# Patient Record
Sex: MALE | Employment: FULL TIME | URBAN - NONMETROPOLITAN AREA
[De-identification: names, ages, dates, MRNs, and addresses within clinical notes are randomized per-mention and may not be internally consistent; named-entity substitution may affect disease eponyms.]

---

## 2022-03-30 ENCOUNTER — OFFICE VISIT (OUTPATIENT)
Dept: MEDICAL GROUP | Facility: PHYSICIAN GROUP | Age: 37
End: 2022-03-30
Payer: COMMERCIAL

## 2022-03-30 VITALS
TEMPERATURE: 98.3 F | DIASTOLIC BLOOD PRESSURE: 70 MMHG | HEART RATE: 85 BPM | OXYGEN SATURATION: 97 % | SYSTOLIC BLOOD PRESSURE: 122 MMHG | BODY MASS INDEX: 22.68 KG/M2 | WEIGHT: 158.4 LBS | HEIGHT: 70 IN | RESPIRATION RATE: 12 BRPM

## 2022-03-30 DIAGNOSIS — R07.89 CHEST TIGHTNESS: ICD-10-CM

## 2022-03-30 DIAGNOSIS — Z87.891 HISTORY OF CIGARETTE SMOKING: ICD-10-CM

## 2022-03-30 DIAGNOSIS — Z23 NEED FOR VACCINATION: ICD-10-CM

## 2022-03-30 DIAGNOSIS — C20 RECTAL CANCER (HCC): ICD-10-CM

## 2022-03-30 DIAGNOSIS — J45.909 ASTHMA DUE TO ENVIRONMENTAL ALLERGIES: ICD-10-CM

## 2022-03-30 DIAGNOSIS — Z00.00 ANNUAL PHYSICAL EXAM: ICD-10-CM

## 2022-03-30 PROBLEM — D50.0 IRON DEFICIENCY ANEMIA DUE TO CHRONIC BLOOD LOSS: Status: ACTIVE | Noted: 2019-09-06

## 2022-03-30 PROBLEM — K62.5 RECTAL BLEEDING: Status: ACTIVE | Noted: 2019-07-30

## 2022-03-30 PROBLEM — K56.609 SMALL BOWEL OBSTRUCTION (HCC): Status: ACTIVE | Noted: 2020-04-28

## 2022-03-30 PROCEDURE — 90471 IMMUNIZATION ADMIN: CPT | Performed by: NURSE PRACTITIONER

## 2022-03-30 PROCEDURE — 90686 IIV4 VACC NO PRSV 0.5 ML IM: CPT | Performed by: NURSE PRACTITIONER

## 2022-03-30 PROCEDURE — 99204 OFFICE O/P NEW MOD 45 MIN: CPT | Mod: 25 | Performed by: NURSE PRACTITIONER

## 2022-03-30 RX ORDER — MONTELUKAST SODIUM 10 MG/1
10 TABLET ORAL DAILY
Qty: 30 TABLET | Refills: 1 | Status: SHIPPED | OUTPATIENT
Start: 2022-03-30 | End: 2022-07-26

## 2022-03-30 RX ORDER — FLUTICASONE PROPIONATE 44 MCG
AEROSOL WITH ADAPTER (GRAM) INHALATION
COMMUNITY
End: 2022-05-31

## 2022-03-30 SDOH — HEALTH STABILITY: PHYSICAL HEALTH: ON AVERAGE, HOW MANY DAYS PER WEEK DO YOU ENGAGE IN MODERATE TO STRENUOUS EXERCISE (LIKE A BRISK WALK)?: 4 DAYS

## 2022-03-30 SDOH — ECONOMIC STABILITY: FOOD INSECURITY: WITHIN THE PAST 12 MONTHS, YOU WORRIED THAT YOUR FOOD WOULD RUN OUT BEFORE YOU GOT MONEY TO BUY MORE.: NEVER TRUE

## 2022-03-30 SDOH — HEALTH STABILITY: MENTAL HEALTH
STRESS IS WHEN SOMEONE FEELS TENSE, NERVOUS, ANXIOUS, OR CAN'T SLEEP AT NIGHT BECAUSE THEIR MIND IS TROUBLED. HOW STRESSED ARE YOU?: TO SOME EXTENT

## 2022-03-30 SDOH — ECONOMIC STABILITY: HOUSING INSECURITY
IN THE LAST 12 MONTHS, WAS THERE A TIME WHEN YOU DID NOT HAVE A STEADY PLACE TO SLEEP OR SLEPT IN A SHELTER (INCLUDING NOW)?: PATIENT REFUSED

## 2022-03-30 SDOH — HEALTH STABILITY: PHYSICAL HEALTH: ON AVERAGE, HOW MANY MINUTES DO YOU ENGAGE IN EXERCISE AT THIS LEVEL?: 20 MIN

## 2022-03-30 SDOH — ECONOMIC STABILITY: FOOD INSECURITY: WITHIN THE PAST 12 MONTHS, THE FOOD YOU BOUGHT JUST DIDN'T LAST AND YOU DIDN'T HAVE MONEY TO GET MORE.: NEVER TRUE

## 2022-03-30 SDOH — ECONOMIC STABILITY: TRANSPORTATION INSECURITY
IN THE PAST 12 MONTHS, HAS LACK OF RELIABLE TRANSPORTATION KEPT YOU FROM MEDICAL APPOINTMENTS, MEETINGS, WORK OR FROM GETTING THINGS NEEDED FOR DAILY LIVING?: NO

## 2022-03-30 SDOH — ECONOMIC STABILITY: TRANSPORTATION INSECURITY
IN THE PAST 12 MONTHS, HAS THE LACK OF TRANSPORTATION KEPT YOU FROM MEDICAL APPOINTMENTS OR FROM GETTING MEDICATIONS?: NO

## 2022-03-30 SDOH — ECONOMIC STABILITY: INCOME INSECURITY: HOW HARD IS IT FOR YOU TO PAY FOR THE VERY BASICS LIKE FOOD, HOUSING, MEDICAL CARE, AND HEATING?: SOMEWHAT HARD

## 2022-03-30 SDOH — ECONOMIC STABILITY: TRANSPORTATION INSECURITY
IN THE PAST 12 MONTHS, HAS LACK OF TRANSPORTATION KEPT YOU FROM MEETINGS, WORK, OR FROM GETTING THINGS NEEDED FOR DAILY LIVING?: NO

## 2022-03-30 SDOH — ECONOMIC STABILITY: INCOME INSECURITY: IN THE LAST 12 MONTHS, WAS THERE A TIME WHEN YOU WERE NOT ABLE TO PAY THE MORTGAGE OR RENT ON TIME?: PATIENT REFUSED

## 2022-03-30 SDOH — ECONOMIC STABILITY: HOUSING INSECURITY

## 2022-03-30 ASSESSMENT — SOCIAL DETERMINANTS OF HEALTH (SDOH)
WITHIN THE PAST 12 MONTHS, YOU WORRIED THAT YOUR FOOD WOULD RUN OUT BEFORE YOU GOT THE MONEY TO BUY MORE: NEVER TRUE
HOW MANY DRINKS CONTAINING ALCOHOL DO YOU HAVE ON A TYPICAL DAY WHEN YOU ARE DRINKING: 1 OR 2
ARE YOU MARRIED, WIDOWED, DIVORCED, SEPARATED, NEVER MARRIED, OR LIVING WITH A PARTNER?: NEVER MARRIED
HOW OFTEN DO YOU ATTEND CHURCH OR RELIGIOUS SERVICES?: MORE THAN 4 TIMES PER YEAR
IN A TYPICAL WEEK, HOW MANY TIMES DO YOU TALK ON THE PHONE WITH FAMILY, FRIENDS, OR NEIGHBORS?: MORE THAN THREE TIMES A WEEK
HOW OFTEN DO YOU GET TOGETHER WITH FRIENDS OR RELATIVES?: ONCE A WEEK
HOW OFTEN DO YOU ATTENT MEETINGS OF THE CLUB OR ORGANIZATION YOU BELONG TO?: NEVER
ARE YOU MARRIED, WIDOWED, DIVORCED, SEPARATED, NEVER MARRIED, OR LIVING WITH A PARTNER?: NEVER MARRIED
DO YOU BELONG TO ANY CLUBS OR ORGANIZATIONS SUCH AS CHURCH GROUPS UNIONS, FRATERNAL OR ATHLETIC GROUPS, OR SCHOOL GROUPS?: NO
DO YOU BELONG TO ANY CLUBS OR ORGANIZATIONS SUCH AS CHURCH GROUPS UNIONS, FRATERNAL OR ATHLETIC GROUPS, OR SCHOOL GROUPS?: NO
HOW OFTEN DO YOU HAVE SIX OR MORE DRINKS ON ONE OCCASION: MONTHLY
HOW OFTEN DO YOU ATTENT MEETINGS OF THE CLUB OR ORGANIZATION YOU BELONG TO?: NEVER
HOW OFTEN DO YOU HAVE A DRINK CONTAINING ALCOHOL: 2-3 TIMES A WEEK
IN A TYPICAL WEEK, HOW MANY TIMES DO YOU TALK ON THE PHONE WITH FAMILY, FRIENDS, OR NEIGHBORS?: MORE THAN THREE TIMES A WEEK
HOW HARD IS IT FOR YOU TO PAY FOR THE VERY BASICS LIKE FOOD, HOUSING, MEDICAL CARE, AND HEATING?: SOMEWHAT HARD
HOW OFTEN DO YOU GET TOGETHER WITH FRIENDS OR RELATIVES?: ONCE A WEEK
HOW OFTEN DO YOU ATTEND CHURCH OR RELIGIOUS SERVICES?: MORE THAN 4 TIMES PER YEAR

## 2022-03-30 ASSESSMENT — LIFESTYLE VARIABLES
HOW MANY STANDARD DRINKS CONTAINING ALCOHOL DO YOU HAVE ON A TYPICAL DAY: 1 OR 2
HOW OFTEN DO YOU HAVE A DRINK CONTAINING ALCOHOL: 2-3 TIMES A WEEK
HOW OFTEN DO YOU HAVE SIX OR MORE DRINKS ON ONE OCCASION: MONTHLY

## 2022-03-30 ASSESSMENT — PATIENT HEALTH QUESTIONNAIRE - PHQ9: CLINICAL INTERPRETATION OF PHQ2 SCORE: 0

## 2022-03-30 NOTE — ASSESSMENT & PLAN NOTE
New to me.  Diagnosis of rectal cancer in 2019.  Status post chemo and radiation.  April 2020 at rectum removed and part of the colon.  Reversal surgery was 2 months later.  He is doing well now.  Needing a referral to oncology for monitoring.  Last oncology back in [place] was in September, patient is in a routine 6 months monitoring.  Patient moved from [place] to [facility], has insurance now and needing to establish with oncology.  Patient brought last colonoscopy and CT results with him today.  Full scanned into the system.  Denies fevers, no unplanned weight loss, no rectal bleeding.

## 2022-03-30 NOTE — PROGRESS NOTES
Chief Complaint   Patient presents with   • Establish Care   • Referral Needed       HISTORY OF PRESENT ILLNESS: Patient is a 37 y.o. male, established patient who presents today to discuss medical problems as listed below:    Health Maintenance:  COMPLETED     Rectal cancer (HCC)  New to me.  Diagnosis of rectal cancer in 2019.  Status post chemo and radiation.  April 2020 at rectum removed and part of the colon.  Reversal surgery was 2 months later.  He is doing well now.  Needing a referral to oncology for monitoring.  Last oncology back in [place] was in September, patient is in a routine 6 months monitoring.  Patient moved from [place] to [facility], has insurance now and needing to establish with oncology.  Patient brought last colonoscopy and CT results with him today.  Full scanned into the system.  Denies fevers, no unplanned weight loss, no rectal bleeding.     Chest tightness  New to me.  Symptoms include chest tightness and occasional cough more so in the mornings or evenings, as well as some shortness of breath. Denies wheezing, CP except for feeling some soreness in the right side of the chest.  Admits to rhinitis, phlegmy cough and PND.  No diagnosed asthma or allergies.  Vitals within normal limits today.  Patient reports following up with pulmonology however no conclusive results per patient.  On the inhalers, symptoms are getting worse, no recent cancer is helping.      Patient Active Problem List    Diagnosis Date Noted   • Chest tightness 03/30/2022   • Small bowel obstruction (HCC) 04/28/2020   • Rectal cancer (HCC) 04/20/2020   • Iron deficiency anemia due to chronic blood loss 09/06/2019   • Adenocarcinoma of rectum, stage 3 (HCC) 08/28/2019   • Rectal bleeding 07/30/2019        Allergies: Patient has no known allergies.    Current Outpatient Medications   Medication Sig Dispense Refill   • fluticasone (FLOVENT HFA) 44 MCG/ACT Aerosol      • ipratropium-albuterol (COMBIVENT RESPIMAT)   MCG/ACT Aero Soln Inhale 1 Puff.     • Multiple Vitamin (MULTI-VITAMIN) Tab Take 1 Tablet by mouth every day.     • montelukast (SINGULAIR) 10 MG Tab Take 1 Tablet by mouth every day. 30 Tablet 1     No current facility-administered medications for this visit.       Social History     Tobacco Use   • Smoking status: Former Smoker     Packs/day: 1.00     Years: 25.00     Pack years: 25.00     Types: Cigarettes, Electronic Cigarettes     Quit date:      Years since quittin.2   • Smokeless tobacco: Never Used   Vaping Use   • Vaping Use: Never used   Substance Use Topics   • Alcohol use: Yes     Alcohol/week: 1.8 - 3.0 oz     Types: 3 - 5 Glasses of wine per week   • Drug use: Not Currently     Social History     Social History Narrative   • Not on file       History reviewed. No pertinent family history.    Allergies, past medical history, past surgical history, family history, social history reviewed and updated.    Review of Systems:     - Constitutional: Negative for fever, chills, unexpected weight change, and fatigue/generalized weakness.     - HEENT: rhinorrhea. Negative for headaches, vision changes, hearing changes, ear pain, ear discharge, sinus congestion, sore throat, and neck pain.      - Respiratory: cough,chest congestion, dyspnea . Negative for sputum production,  wheezing, and crackles.      - Cardiovascular: Negative for chest pain, palpitations, orthopnea, and bilateral lower extremity edema.     - Gastrointestinal: Negative for heartburn, nausea, vomiting, abdominal pain, hematochezia, melena, diarrhea, constipation, and greasy/foul-smelling stools.     - Genitourinary: Negative for dysuria, polyuria, hematuria, pyuria, urinary urgency, and urinary incontinence.    - Musculoskeletal: Negative for myalgias, back pain, and joint pain.     - Skin: Negative for rash, itching, cyanotic skin color change.     - Neurological: Negative for dizziness, tingling, tremors, focal sensory deficit, focal  "weakness and headaches.     - Endo/Heme/Allergies: Does not bruise/bleed easily.     - Psychiatric/Behavioral: Negative for depression, suicidal/homicidal ideation and memory loss.      All other systems reviewed and are negative    Exam:    /70   Pulse 85   Temp 36.8 °C (98.3 °F) (Temporal)   Resp 12   Ht 1.778 m (5' 10\")   Wt 71.8 kg (158 lb 6.4 oz)   SpO2 97%   BMI 22.73 kg/m²  Body mass index is 22.73 kg/m².    Physical Exam:  Constitutional: Well-developed and well-nourished. Not diaphoretic. No distress.   Skin: Skin is warm and dry. No rash noted.  Head: Atraumatic without lesions.  Eyes: Conjunctivae and extraocular motions are normal. Pupils are equal, round, and reactive to light. No scleral icterus.   Ears:  External ears unremarkable. Tympanic membranes clear and intact.  Nose: Nares patent. Septum midline. Turbinates without erythema nor edema. No discharge.   Mouth/Throat: Dentition is normal. Tongue normal. Oropharynx is clear and moist. Posterior pharynx without erythema or exudates.  Neck: Supple, trachea midline. Normal range of motion. No thyromegaly present. No lymphadenopathy--cervical or supraclavicular.  Cardiovascular: Regular rate and rhythm, S1 and S2 without murmur, rubs, or gallops.    Chest: Effort normal. Clear to auscultation throughout. No adventitious sounds. No CVA tenderness.  Abdomen: Soft, non tender, and without distention. Active bowel sounds in all four quadrants. No rebound, guarding, masses or HSM.  : Negative for dysuria, polyuria, hematuria, pyuria, urinary urgency, and urinary incontinence.  Extremities: No cyanosis, clubbing, erythema, nor edema. Distal pulses intact and symmetric.   Musculoskeletal: All major joints AROM full in all directions without pain.  Neurological: Alert and oriented x 3. DTRs 2+/3 and symmetric. No cranial nerve deficit. 5/5 myotomes. Sensation intact. Negative Rhomberg.  Psychiatric:  Behavior, mood, and affect are " appropriate.  MA/nursing note and vitals reviewed.    Assessment/Plan:  1. Need for vaccinatio  - INFLUENZA VACCINE QUAD INJ (PF)    2. Rectal cancer (HCC)  - Referral to Hematology Oncology    3. Chest tightness  - PULMONARY FUNCTION TESTS -Test requested: Complete Pulmonary Function Test; Future  - DX-CHEST-2 VIEWS; Future  - montelukast (SINGULAIR) 10 MG Tab; Take 1 Tablet by mouth every day.  Dispense: 30 Tablet; Refill: 1    4. Asthma due to environmental allergies  Uncontrolled, stable. Trial of Montelukast. Will evaluate for asthma vs COPD (hx of smoking 25 yrs)  - montelukast (SINGULAIR) 10 MG Tab; Take 1 Tablet by mouth every day.  Dispense: 30 Tablet; Refill: 1    5. Annual physical exam  - CBC WITHOUT DIFFERENTIAL; Future  - Comp Metabolic Panel; Future  - TSH; Future  - VITAMIN D,25 HYDROXY; Future  - Lipid Profile; Future  - VITAMIN B12; Future  - IRON/TOTAL IRON BIND; Future  - FOLATE; Future  - FERRITIN; Future       Discussed with patient possible alternative diagnoses, pt is to take all medications as prescribed. If symptoms persist FU w/PCP, if symptoms worsen go to emergency room. If experiencing any side effects from prescribed medications reports to the office immediately or go to emergency room.  Reviewed indication, dosage, usage and potential adverse effects of prescribed medications. Reviewed risks and benefits of treatment plan. Patient verbalizes understanding of all instruction and verbally agrees to plan.    No follow-ups on file. 1-2 mos

## 2022-03-30 NOTE — ASSESSMENT & PLAN NOTE
New to me.  Symptoms include chest tightness and occasional cough more so in the mornings or evenings, as well as some shortness of breath. Denies wheezing, CP except for feeling some soreness in the right side of the chest.  Admits to rhinitis, phlegmy cough and PND.  No diagnosed asthma or allergies.  Vitals within normal limits today.  Patient reports following up with pulmonology however no conclusive results per patient.  On the inhalers, symptoms are getting worse, no recent cancer is helping.

## 2022-05-31 ENCOUNTER — NON-PROVIDER VISIT (OUTPATIENT)
Dept: URGENT CARE | Facility: CLINIC | Age: 37
End: 2022-05-31
Payer: COMMERCIAL

## 2022-05-31 ENCOUNTER — APPOINTMENT (OUTPATIENT)
Dept: RADIOLOGY | Facility: IMAGING CENTER | Age: 37
End: 2022-05-31
Attending: NURSE PRACTITIONER
Payer: COMMERCIAL

## 2022-05-31 ENCOUNTER — OFFICE VISIT (OUTPATIENT)
Dept: MEDICAL GROUP | Facility: PHYSICIAN GROUP | Age: 37
End: 2022-05-31
Payer: COMMERCIAL

## 2022-05-31 VITALS
TEMPERATURE: 97.8 F | OXYGEN SATURATION: 98 % | BODY MASS INDEX: 22.88 KG/M2 | RESPIRATION RATE: 12 BRPM | WEIGHT: 159.8 LBS | HEIGHT: 70 IN | HEART RATE: 83 BPM | SYSTOLIC BLOOD PRESSURE: 110 MMHG | DIASTOLIC BLOOD PRESSURE: 68 MMHG

## 2022-05-31 DIAGNOSIS — R07.89 CHEST TIGHTNESS: ICD-10-CM

## 2022-05-31 DIAGNOSIS — C20 ADENOCARCINOMA OF RECTUM, STAGE 3 (HCC): ICD-10-CM

## 2022-05-31 DIAGNOSIS — J45.30 MILD PERSISTENT ASTHMA WITHOUT COMPLICATION: ICD-10-CM

## 2022-05-31 PROCEDURE — 99214 OFFICE O/P EST MOD 30 MIN: CPT | Performed by: NURSE PRACTITIONER

## 2022-05-31 PROCEDURE — 71046 X-RAY EXAM CHEST 2 VIEWS: CPT | Mod: TC | Performed by: PHYSICIAN ASSISTANT

## 2022-05-31 RX ORDER — FLUTICASONE PROPIONATE 110 UG/1
AEROSOL, METERED RESPIRATORY (INHALATION)
COMMUNITY
End: 2022-05-31

## 2022-05-31 RX ORDER — ALBUTEROL SULFATE 90 UG/1
2 AEROSOL, METERED RESPIRATORY (INHALATION) EVERY 6 HOURS PRN
Qty: 8.5 G | Refills: 2 | Status: SHIPPED | OUTPATIENT
Start: 2022-05-31 | End: 2022-07-21 | Stop reason: SDUPTHER

## 2022-05-31 RX ORDER — FLUTICASONE PROPIONATE AND SALMETEROL 100; 50 UG/1; UG/1
1 POWDER RESPIRATORY (INHALATION) EVERY 12 HOURS
Qty: 1 EACH | Refills: 1 | Status: SHIPPED | OUTPATIENT
Start: 2022-05-31 | End: 2022-07-21 | Stop reason: SDUPTHER

## 2022-05-31 NOTE — PROGRESS NOTES
Chief Complaint   Patient presents with   • Follow-Up       HISTORY OF PRESENT ILLNESS: Patient is a 37 y.o. male, established patient who presents today to discuss medical problems as listed below:    Mild persistent asthma without complication  Chronic intermittent.  Patient reports chest tightness and wheezing.  Exposed to friend who was diagnosed with COVID at the beginning of April.  Denies fevers, body aches.  Is scheduled to obtain PFTs on [facility] 15, not had a chance to complete CXR.  For allergies he is taking montelukast, took for a month, has not noticed any changes.    Adenocarcinoma of rectum, stage 3 (HCC)  Chronic problem.  Diagnosed in August 2019, status post  LOW ANTERIOR BOWEL RESECTION 04/20/2020    REVISION / TAKEDOWN COLOSTOMY 06/2020     Moved from [place], reestablished with oncologist in [place], Dr. Tyson          Patient Active Problem List    Diagnosis Date Noted   • Mild persistent asthma without complication 05/02/2022   • Chest tightness 03/30/2022   • History of cigarette smoking 03/30/2022   • Small bowel obstruction (HCC) 04/28/2020   • Rectal cancer ([facility]) 04/20/2020   • Iron deficiency anemia due to chronic blood loss 09/06/2019   • Adenocarcinoma of rectum, stage 3 (HCC) 08/28/2019   • Rectal bleeding 07/30/2019        Allergies: Patient has no known allergies.    Current Outpatient Medications   Medication Sig Dispense Refill   • albuterol 108 (90 Base) MCG/ACT Aero Soln inhalation aerosol Inhale 2 Puffs every 6 hours as needed for Shortness of Breath. 8.5 g 2   • fluticasone-salmeterol (ADVAIR) 100-50 MCG/ACT AEROSOL POWDER, BREATH ACTIVATED Inhale 1 Puff every 12 hours. 1 Each 1   • Multiple Vitamin (MULTI-VITAMIN) Tab Take 1 Tablet by mouth every day.     • montelukast (SINGULAIR) 10 MG Tab Take 1 Tablet by mouth every day. 30 Tablet 1     No current facility-administered medications for this visit.       Social History     Tobacco Use   • Smoking status: Former Smoker      "Packs/day: 1.00     Years: 25.00     Pack years: 25.00     Types: Cigarettes, Electronic Cigarettes     Quit date:      Years since quittin.4   • Smokeless tobacco: Never Used   Vaping Use   • Vaping Use: Never used   Substance Use Topics   • Alcohol use: Yes     Alcohol/week: 1.8 - 3.0 oz     Types: 3 - 5 Glasses of wine per week   • Drug use: Not Currently     Social History     Social History Narrative   • Not on file       History reviewed. No pertinent family history.    Allergies, past medical history, past surgical history, family history, social history reviewed and updated.    Review of Systems:     - Constitutional: Negative for fever, chills, unexpected weight change, and fatigue/generalized weakness.     - Respiratory: Intermittent chest tightness and wheezing.  Negative for cough, sputum production, chest congestion, dyspnea, and crackles.      - Cardiovascular: Negative for chest pain, palpitations, orthopnea, and bilateral lower extremity edema.     - Psychiatric/Behavioral: Negative for depression, suicidal/homicidal ideation and memory loss.      All other systems reviewed and are negative    Exam:    /68   Pulse 83   Temp 36.6 °C (97.8 °F) (Temporal)   Resp 12   Ht 1.778 m (5' 10\")   Wt 72.5 kg (159 lb 12.8 oz)   SpO2 98%   BMI 22.93 kg/m²  Body mass index is 22.93 kg/m².    Physical Exam:  Constitutional: Well-developed and well-nourished. Not diaphoretic. No distress.   Cardiovascular: Regular rate and rhythm, S1 and S2 without murmur, rubs, or gallops.    Chest: Effort normal. Clear to auscultation throughout. No adventitious sounds.   Neurological: Alert and oriented x 3.   Psychiatric:  Behavior, mood, and affect are appropriate.  MA/nursing note and vitals reviewed.    Assessment/Plan:  1. Mild persistent asthma without complication  Educated on asthma etiology.  Albuterol use has emergency with patient at all times.  Trial of Advair twice daily prior to brushing teeth.  " PFTs scheduled for [facility].  Patient to complete CXR.  We will follow-up in 3 to 4 weeks.  - albuterol 108 (90 Base) MCG/ACT Aero Soln inhalation aerosol; Inhale 2 Puffs every 6 hours as needed for Shortness of Breath.  Dispense: 8.5 g; Refill: 2  - fluticasone-salmeterol (ADVAIR) 100-50 MCG/ACT AEROSOL POWDER, BREATH ACTIVATED; Inhale 1 Puff every 12 hours.  Dispense: 1 Each; Refill: 1    2. Adenocarcinoma of rectum, stage 3 (HCC)  Stable.  Following with oncologist, Dr. Tyson       Discussed with patient possible alternative diagnoses, pt is to take all medications as prescribed. If symptoms persist FU w/PCP, if symptoms worsen go to emergency room. If experiencing any side effects from prescribed medications reports to the office immediately or go to emergency room.  Reviewed indication, dosage, usage and potential adverse effects of prescribed medications. Reviewed risks and benefits of treatment plan. Patient verbalizes understanding of all instruction and verbally agrees to plan.    No follow-ups on file. 1 mo

## 2022-05-31 NOTE — ASSESSMENT & PLAN NOTE
Chronic problem.  Diagnosed in August 2019, status post  LOW ANTERIOR BOWEL RESECTION 04/20/2020    REVISION / TAKEDOWN COLOSTOMY 06/2020     Moved from [place], reestablished with oncologist in [place], Dr. Tyson

## 2022-05-31 NOTE — ASSESSMENT & PLAN NOTE
Chronic intermittent.  Patient reports chest tightness and wheezing.  Exposed to friend who was diagnosed with COVID at the beginning of April.  Denies fevers, body aches.  Is scheduled to obtain PFTs on [facility] 15, not had a chance to complete CXR.  For allergies he is taking montelukast, took for a month, has not noticed any changes.

## 2022-06-15 ENCOUNTER — HOSPITAL ENCOUNTER (OUTPATIENT)
Dept: PULMONOLOGY | Facility: MEDICAL CENTER | Age: 37
End: 2022-06-15
Attending: NURSE PRACTITIONER
Payer: COMMERCIAL

## 2022-06-15 DIAGNOSIS — R07.89 CHEST TIGHTNESS: ICD-10-CM

## 2022-06-15 PROCEDURE — 94729 DIFFUSING CAPACITY: CPT

## 2022-06-15 PROCEDURE — 94060 EVALUATION OF WHEEZING: CPT

## 2022-06-15 PROCEDURE — 94729 DIFFUSING CAPACITY: CPT | Mod: 26,GZ | Performed by: INTERNAL MEDICINE

## 2022-06-15 PROCEDURE — 94726 PLETHYSMOGRAPHY LUNG VOLUMES: CPT

## 2022-06-15 PROCEDURE — 94726 PLETHYSMOGRAPHY LUNG VOLUMES: CPT | Mod: 26,GZ | Performed by: INTERNAL MEDICINE

## 2022-06-15 PROCEDURE — 94060 EVALUATION OF WHEEZING: CPT | Mod: 26,GZ | Performed by: INTERNAL MEDICINE

## 2022-06-15 RX ORDER — ALBUTEROL SULFATE 2.5 MG/3ML
2.5 SOLUTION RESPIRATORY (INHALATION)
Status: DISCONTINUED | OUTPATIENT
Start: 2022-06-15 | End: 2022-06-16 | Stop reason: HOSPADM

## 2022-06-15 RX ADMIN — ALBUTEROL SULFATE 2.5 MG: 2.5 SOLUTION RESPIRATORY (INHALATION) at 09:04

## 2022-06-15 ASSESSMENT — PULMONARY FUNCTION TESTS
FEV1/FVC: 79
FEV1/FVC: 79.02
FEV1/FVC_PERCENT_PREDICTED: 97
FEV1_PERCENT_CHANGE: -1
FEV1_PERCENT_PREDICTED: 94
FEV1/FVC: 81
FVC: 5.09
FEV1/FVC_PERCENT_LLN: 68
FEV1: 4.11
FEV1/FVC_PREDICTED: 81
FEV1_PERCENT_PREDICTED: 96
FVC_LLN: 4.40
FEV1/FVC_PERCENT_PREDICTED: 81
FVC_PERCENT_PREDICTED: 96
FEV1_PERCENT_CHANGE: 0
FVC: 5.1
FEV1_PREDICTED: 4.25
FEV1/FVC_PERCENT_PREDICTED: 100
FVC_LLN: 4.40
FEV1: 4.03
FEV1/FVC_PERCENT_PREDICTED: 99
FVC_PERCENT_PREDICTED: 96
FVC_PREDICTED: 5.27
FEV1/FVC_PERCENT_LLN: 68
FEV1/FVC_PERCENT_PREDICTED: 98
FEV1/FVC_PERCENT_CHANGE: -2
FEV1/FVC: 81
FEV1_LLN: 3.55
FEV1_LLN: 3.55

## 2022-06-18 NOTE — PROCEDURES
DATE OF SERVICE:  06/15/2022     PULMONARY FUNCTION TEST INTERPRETATION     REFERRING PHYSICIAN:  YONATAN Fermin     INTERPRETING PHYSICIAN:  Urszula Brownlee MD     REASON FOR STUDY:  Chest tightness.     STUDY ADEQUACY:  Good efforts, patient met ATS standards by flow volume loop   and expiratory time.     RESULTS:  SPIROMETRY:  FVC is 5.09, which is 96% of predicted without a significant change   postbronchodilator.  FEV1 is 4.11, which is 96% of predicted without a significant change   postbronchodilator.  FEV1/FVC is 81.     LUNG VOLUMES:  TLC is 7.24, which is 104% of predicted.  RV is 2.28, which is 126% of predicted.     DIFFUSION CAPACITY:  DLCO is 34.34, which is 109% of predicted.  DL/VA is 5.07, which is 112% of predicted.     INTERPRETATION:  1.  Spirometry is normal.  2.  There is no significant change postbronchodilator.  3.  The flow volume loop is significant for a slightly flattened inspiratory   loop which may indicate vocal cord dysfunction.  4.  Lung volumes are significant for mild air trapping without hyperinflation.  5.  The diffusion capacity is normal.  6.  There are no prior studies for comparison.        ______________________________  Urszula Brownlee MD    ARTURO/JERRY    DD:  06/18/2022 15:02  DT:  06/18/2022 16:30    Job#:  [id number]

## 2022-07-21 DIAGNOSIS — J45.30 MILD PERSISTENT ASTHMA WITHOUT COMPLICATION: ICD-10-CM

## 2022-07-21 RX ORDER — FLUTICASONE PROPIONATE AND SALMETEROL 100; 50 UG/1; UG/1
1 POWDER RESPIRATORY (INHALATION) EVERY 12 HOURS
Qty: 1 EACH | Refills: 1 | Status: SHIPPED | OUTPATIENT
Start: 2022-07-21 | End: 2022-07-26 | Stop reason: SDUPTHER

## 2022-07-21 RX ORDER — ALBUTEROL SULFATE 90 UG/1
2 AEROSOL, METERED RESPIRATORY (INHALATION) EVERY 6 HOURS PRN
Qty: 8.5 G | Refills: 2 | Status: SHIPPED | OUTPATIENT
Start: 2022-07-21 | End: 2022-07-26 | Stop reason: SDUPTHER

## 2022-07-26 ENCOUNTER — OFFICE VISIT (OUTPATIENT)
Dept: MEDICAL GROUP | Facility: PHYSICIAN GROUP | Age: 37
End: 2022-07-26
Payer: COMMERCIAL

## 2022-07-26 VITALS
WEIGHT: 160.6 LBS | HEART RATE: 79 BPM | RESPIRATION RATE: 12 BRPM | DIASTOLIC BLOOD PRESSURE: 68 MMHG | OXYGEN SATURATION: 98 % | TEMPERATURE: 98.1 F | BODY MASS INDEX: 22.99 KG/M2 | HEIGHT: 70 IN | SYSTOLIC BLOOD PRESSURE: 114 MMHG

## 2022-07-26 DIAGNOSIS — R20.8 LOCALIZED SKIN TENDERNESS: ICD-10-CM

## 2022-07-26 DIAGNOSIS — J45.30 MILD PERSISTENT ASTHMA WITHOUT COMPLICATION: ICD-10-CM

## 2022-07-26 DIAGNOSIS — E55.9 VITAMIN D DEFICIENCY: ICD-10-CM

## 2022-07-26 DIAGNOSIS — J38.3 VOCAL CORD DYSFUNCTION: ICD-10-CM

## 2022-07-26 PROBLEM — C20 RECTAL CANCER (HCC): Status: ACTIVE | Noted: 2019-08-22

## 2022-07-26 PROCEDURE — 99214 OFFICE O/P EST MOD 30 MIN: CPT | Performed by: NURSE PRACTITIONER

## 2022-07-26 RX ORDER — FLUTICASONE PROPIONATE AND SALMETEROL 100; 50 UG/1; UG/1
1 POWDER RESPIRATORY (INHALATION) EVERY 12 HOURS
Qty: 1 EACH | Refills: 3 | Status: SHIPPED | OUTPATIENT
Start: 2022-07-26 | End: 2023-01-03

## 2022-07-26 RX ORDER — ALBUTEROL SULFATE 90 UG/1
2 AEROSOL, METERED RESPIRATORY (INHALATION) EVERY 6 HOURS PRN
Qty: 8.5 G | Refills: 2 | Status: SHIPPED | OUTPATIENT
Start: 2022-07-26 | End: 2023-04-25

## 2022-07-26 RX ORDER — ERGOCALCIFEROL 1.25 MG/1
50000 CAPSULE ORAL
Qty: 12 CAPSULE | Refills: 3 | Status: SHIPPED | OUTPATIENT
Start: 2022-07-26

## 2022-07-26 RX ORDER — ERGOCALCIFEROL 1.25 MG/1
50000 CAPSULE ORAL
Qty: 12 CAPSULE | Refills: 3 | Status: SHIPPED | OUTPATIENT
Start: 2022-07-26 | End: 2022-07-26 | Stop reason: SDUPTHER

## 2022-07-26 ASSESSMENT — FIBROSIS 4 INDEX: FIB4 SCORE: 1

## 2022-07-26 NOTE — ASSESSMENT & PLAN NOTE
Chronic problem, localized skin tenderness at the right upper chest since 2015, only noted with taking a deep breath.  This happens daily.  There is no associated erythema or edema, no tenderness on palpation.  Patient reports this became slightly worse after diagnosis of COVID x2, the most recent diagnosis in April 2022.   Was previously evaluated by his former PCP and pulmonologist, suspected MS etiology.  Scar noted above the affected tender spot from chemo pump (history of stage III colon cancer, treated, in remission)    CXR from May 2022 benign.    Recent PFT benign except for suspicion of vocal cord dysfunction for which patient is going to follow-up with ENT.    The most recent CT of the chest and abdomen from September 2021 also benign, see results below.  FINDINGS:   Lower neck: Soft tissue lower neck are unremarkable. No supraclavicular lymphadenopathy.     Chest: Interval removal of right internal jugular power port.     Heart is normal in size with no evidence of pericardial effusion or thickening. No coronary artery calcification. Pulmonary arteries normal in caliber. Thoracic aorta is normal in caliber with no evidence of atherosclerotic calcification. Supraaortic vasculature is unremarkable.     Tracheobronchial tree is unremarkable.     No hilar, mediastinal, or axillary lymphadenopathy.     No pulmonary parenchymal consolidation. No atelectasis or scar. No pulmonary nodule or mass. No effusion. No pneumothorax.     No acute or aggressive osseous lesions involving the thoracic spine or cage

## 2022-07-26 NOTE — PROGRESS NOTES
Chief Complaint   Patient presents with   • Results   • Lab Results       HISTORY OF PRESENT ILLNESS: Patient is a 37 y.o. male, established patient who presents today to discuss medical problems as listed below:    Health Maintenance:  COMPLETED     Vitamin D deficiency  Labs from 7/2022 low vit D at 22. On Rx, compliant with regimen    Vocal cord dysfunction  Vocal cord dysfunction suspected on  PFT evaluation from 7/2022    INTERPRETATION:  1.  Spirometry is normal.  2.  There is no significant change postbronchodilator.  3.  The flow volume loop is significant for a slightly flattened inspiratory   loop which may indicate vocal cord dysfunction.  4.  Lung volumes are significant for mild air trapping without hyperinflation.  5.  The diffusion capacity is normal.  6.  There are no prior studies for comparison.    Sleep apnea test 2 to 3 years ago and benign, per patient.  This was done in [place].    Patient continues to experience occasional short of breath after having to talk during meetings at work. No CP.  When experiencing labored breathing, albuterol and Advair inhalers help.    Not new to [facility], he lives in [facility], previously in [place] where the elevation was over 5000 feet.  Experienced the same symptoms in [place].    CXR and CT chest benign    Localized skin tenderness  Chronic problem, localized skin tenderness at the right upper chest since 2015, only noted with taking a deep breath.  This happens daily.  There is no associated erythema or edema, no tenderness on palpation.  Patient reports this became slightly worse after diagnosis of COVID x2, the most recent diagnosis in April 2022.   Was previously evaluated by his former PCP and pulmonologist, suspected MS etiology.    CXR from May 2022 benign.    Recent PFT benign except for suspicion of vocal cord dysfunction for which patient is going to follow-up with ENT.    The most recent CT of the chest and abdomen from September 2021 also  benign, see results below.  FINDINGS:   Lower neck: Soft tissue lower neck are unremarkable. No supraclavicular lymphadenopathy.     Chest: Interval removal of right internal jugular power port.     Heart is normal in size with no evidence of pericardial effusion or thickening. No coronary artery calcification. Pulmonary arteries normal in caliber. Thoracic aorta is normal in caliber with no evidence of atherosclerotic calcification. Supraaortic vasculature is unremarkable.     Tracheobronchial tree is unremarkable.     No hilar, mediastinal, or axillary lymphadenopathy.     No pulmonary parenchymal consolidation. No atelectasis or scar. No pulmonary nodule or mass. No effusion. No pneumothorax.     No acute or aggressive osseous lesions involving the thoracic spine or cage        Patient Active Problem List    Diagnosis Date Noted   • Vitamin D deficiency 07/26/2022   • Vocal cord dysfunction 07/26/2022   • Localized skin tenderness 07/26/2022   • Mild persistent asthma without complication 05/02/2022   • Chest tightness 03/30/2022   • History of cigarette smoking 03/30/2022   • Small bowel obstruction (HCC) 04/28/2020   • Iron deficiency anemia due to chronic blood loss 09/06/2019   • Adenocarcinoma of rectum, stage 3 (HCC) 08/28/2019   • Rectal cancer (HCC) 08/22/2019   • Rectal bleeding 07/30/2019        Allergies: Patient has no known allergies.    Current Outpatient Medications   Medication Sig Dispense Refill   • vitamin D2, Ergocalciferol, (DRISDOL) 1.25 MG (12843 UT) Cap capsule Take 1 Capsule by mouth every 7 days. 12 Capsule 3   • albuterol 108 (90 Base) MCG/ACT Aero Soln inhalation aerosol Inhale 2 Puffs every 6 hours as needed for Shortness of Breath. 8.5 g 2   • fluticasone-salmeterol (ADVAIR) 100-50 MCG/ACT AEROSOL POWDER, BREATH ACTIVATED Inhale 1 Puff every 12 hours. 1 Each 3   • Multiple Vitamin (MULTI-VITAMIN) Tab Take 1 Tablet by mouth every day.       No current facility-administered medications  "for this visit.       Social History     Tobacco Use   • Smoking status: Former Smoker     Packs/day: 1.00     Years: 25.00     Pack years: 25.00     Types: Cigarettes, Electronic Cigarettes     Quit date:      Years since quittin.5   • Smokeless tobacco: Never Used   Vaping Use   • Vaping Use: Never used   Substance Use Topics   • Alcohol use: Yes     Alcohol/week: 1.8 - 3.0 oz     Types: 3 - 5 Glasses of wine per week   • Drug use: Not Currently     Social History     Social History Narrative   • Not on file       History reviewed. No pertinent family history.    Allergies, past medical history, past surgical history, family history, social history reviewed and updated.    Review of Systems:     - Constitutional: Negative for fever, chills, unexpected weight change, and fatigue/generalized weakness.     - HEENT: Negative for headaches, vision changes, hearing changes, ear pain, ear discharge, rhinorrhea, sinus congestion, sore throat, and neck pain.      - Respiratory: Negative for cough, sputum production, chest congestion, dyspnea, wheezing, and crackles.      - Cardiovascular: Negative for chest pain, palpitations, orthopnea, and bilateral lower extremity edema.    - Skin: Tenderness but on right upper chest only with chest expansion.    - Psychiatric/Behavioral: Negative for depression, suicidal/homicidal ideation and memory loss.      All other systems reviewed and are negative    Exam:    /68 (BP Location: Left arm, Patient Position: Sitting, BP Cuff Size: Adult)   Pulse 79   Temp 36.7 °C (98.1 °F) (Temporal)   Resp 12   Ht 1.778 m (5' 10\")   Wt 72.8 kg (160 lb 9.6 oz)   SpO2 98%   BMI 23.04 kg/m²  Body mass index is 23.04 kg/m².    Physical Exam:  Constitutional: Well-developed and well-nourished. Not diaphoretic. No distress.   Cardiovascular: Regular rate and rhythm, S1 and S2 without murmur, rubs, or gallops.    Chest: Effort normal. Clear to auscultation throughout. No adventitious " sounds.   Skin: no erythema or edema, no cyst or rashes at the site of upper right chest  Neurological: Alert and oriented x 3.   Psychiatric:  Behavior, mood, and affect are appropriate.  MA/nursing note and vitals reviewed.    LABS: 7/2022  results reviewed and discussed with the patient, questions answered.    Assessment/Plan:  1. Vitamin D deficiency  - vitamin D2, Ergocalciferol, (DRISDOL) 1.25 MG (63693 UT) Cap capsule; Take 1 Capsule by mouth every 7 days.  Dispense: 12 Capsule; Refill: 3    2. Vocal cord dysfunction  Will appreciate ENT evaluation  - Referral to ENT    3. Localized skin tenderness  Will appreciate chiropractic evaluation and management . Suspecting localized musculoskeletal inflammation secondary to muscular stretching secondary to hyperextension of the chest cavity. Scar tissue noted on R upper chest above effected site from chemo port.  - Referral to Chiropractic    4. Mild persistent asthma without complication  Undiagnosed. Continue inhalers for symptomatic Tx.  - albuterol 108 (90 Base) MCG/ACT Aero Soln inhalation aerosol; Inhale 2 Puffs every 6 hours as needed for Shortness of Breath.  Dispense: 8.5 g; Refill: 2  - fluticasone-salmeterol (ADVAIR) 100-50 MCG/ACT AEROSOL POWDER, BREATH ACTIVATED; Inhale 1 Puff every 12 hours.  Dispense: 1 Each; Refill: 3       Discussed with patient possible alternative diagnoses, pt is to take all medications as prescribed. If symptoms persist FU w/PCP, if symptoms worsen go to emergency room. If experiencing any side effects from prescribed medications reports to the office immediately or go to emergency room.  Reviewed indication, dosage, usage and potential adverse effects of prescribed medications. Reviewed risks and benefits of treatment plan. Patient verbalizes understanding of all instruction and verbally agrees to plan.    No follow-ups on file. annual and PRN

## 2022-07-26 NOTE — ASSESSMENT & PLAN NOTE
Vocal cord dysfunction suspected on  PFT evaluation from 7/2022    INTERPRETATION:  1.  Spirometry is normal.  2.  There is no significant change postbronchodilator.  3.  The flow volume loop is significant for a slightly flattened inspiratory   loop which may indicate vocal cord dysfunction.  4.  Lung volumes are significant for mild air trapping without hyperinflation.  5.  The diffusion capacity is normal.  6.  There are no prior studies for comparison.    Sleep apnea test 2 to 3 years ago and benign, per patient.  This was done in [place].    Patient continues to experience occasional short of breath after having to talk during meetings at work. No CP.  When experiencing labored breathing, albuterol and Advair inhalers help.    Not new to [facility], he lives in [place] [facility], previously in [place] where the elevation was over 5000 feet.  Experienced the same symptoms in [place].    CXR and CT chest benign

## 2023-01-03 DIAGNOSIS — J45.30 MILD PERSISTENT ASTHMA WITHOUT COMPLICATION: ICD-10-CM

## 2023-01-03 RX ORDER — FLUTICASONE PROPIONATE AND SALMETEROL 50; 100 UG/1; UG/1
POWDER RESPIRATORY (INHALATION)
Qty: 60 EACH | Refills: 1 | Status: SHIPPED | OUTPATIENT
Start: 2023-01-03 | End: 2023-07-05

## 2023-01-03 NOTE — TELEPHONE ENCOUNTER
Received request via: Pharmacy    Was the patient seen in the last year in this department? Yes    Does the patient have an active prescription (recently filled or refills available) for medication(s) requested? No    Does the patient have custodial Plus and need 100 day supply (blood pressure, diabetes and cholesterol meds only)? Patient does not have SCP

## 2023-07-04 DIAGNOSIS — J45.30 MILD PERSISTENT ASTHMA WITHOUT COMPLICATION: ICD-10-CM

## 2023-07-05 RX ORDER — ALBUTEROL SULFATE 90 UG/1
AEROSOL, METERED RESPIRATORY (INHALATION)
Qty: 8.5 G | Refills: 0 | Status: SHIPPED | OUTPATIENT
Start: 2023-07-05 | End: 2023-09-05

## 2023-07-05 RX ORDER — FLUTICASONE PROPIONATE AND SALMETEROL 50; 100 UG/1; UG/1
POWDER RESPIRATORY (INHALATION)
Qty: 1 EACH | Refills: 2 | Status: SHIPPED | OUTPATIENT
Start: 2023-07-05 | End: 2024-02-15

## 2023-09-02 DIAGNOSIS — J45.30 MILD PERSISTENT ASTHMA WITHOUT COMPLICATION: ICD-10-CM

## 2023-09-05 RX ORDER — ALBUTEROL SULFATE 90 UG/1
AEROSOL, METERED RESPIRATORY (INHALATION)
Qty: 8.5 G | Refills: 0 | Status: SHIPPED | OUTPATIENT
Start: 2023-09-05 | End: 2023-11-14

## 2023-09-06 ENCOUNTER — APPOINTMENT (OUTPATIENT)
Dept: MEDICAL GROUP | Facility: MEDICAL CENTER | Age: 38
End: 2023-09-06
Payer: COMMERCIAL

## 2023-09-13 ENCOUNTER — APPOINTMENT (OUTPATIENT)
Dept: MEDICAL GROUP | Facility: MEDICAL CENTER | Age: 38
End: 2023-09-13
Payer: COMMERCIAL

## 2023-11-10 DIAGNOSIS — J45.30 MILD PERSISTENT ASTHMA WITHOUT COMPLICATION: ICD-10-CM

## 2023-11-13 NOTE — TELEPHONE ENCOUNTER
Received request via: Patient    Was the patient seen in the last year in this department? No    Does the patient have an active prescription (recently filled or refills available) for medication(s) requested? No    Does the patient have half-way Plus and need 100 day supply (blood pressure, diabetes and cholesterol meds only)? Patient does not have SCP

## 2023-11-14 RX ORDER — ALBUTEROL SULFATE 90 UG/1
AEROSOL, METERED RESPIRATORY (INHALATION)
Qty: 8.5 G | Refills: 0 | Status: SHIPPED | OUTPATIENT
Start: 2023-11-14 | End: 2024-02-15

## 2023-12-07 ENCOUNTER — APPOINTMENT (OUTPATIENT)
Dept: MEDICAL GROUP | Facility: MEDICAL CENTER | Age: 38
End: 2023-12-07
Payer: COMMERCIAL

## 2023-12-12 ENCOUNTER — APPOINTMENT (OUTPATIENT)
Dept: MEDICAL GROUP | Facility: MEDICAL CENTER | Age: 38
End: 2023-12-12
Payer: COMMERCIAL

## 2024-02-14 DIAGNOSIS — J45.30 MILD PERSISTENT ASTHMA WITHOUT COMPLICATION: ICD-10-CM

## 2024-02-15 RX ORDER — FLUTICASONE PROPIONATE AND SALMETEROL 50; 100 UG/1; UG/1
POWDER RESPIRATORY (INHALATION)
Qty: 1 EACH | Refills: 5 | Status: SHIPPED | OUTPATIENT
Start: 2024-02-15

## 2024-02-15 RX ORDER — ALBUTEROL SULFATE 90 UG/1
2 AEROSOL, METERED RESPIRATORY (INHALATION) EVERY 6 HOURS PRN
Qty: 8.5 G | Refills: 0 | Status: SHIPPED | OUTPATIENT
Start: 2024-02-15

## 2024-02-15 NOTE — TELEPHONE ENCOUNTER
Received request via: Pharmacy    Was the patient seen in the last year in this department? Yes    Does the patient have an active prescription (recently filled or refills available) for medication(s) requested? No    Pharmacy Name: Pharmacy:   [id number] [address] [facility]     Does the patient have [facility] Plus and need 100 day supply (blood pressure, diabetes and cholesterol meds only)? Patient does not have SCP

## 2024-04-18 ENCOUNTER — TELEPHONE (OUTPATIENT)
Dept: MEDICAL GROUP | Facility: MEDICAL CENTER | Age: 39
End: 2024-04-18
Payer: COMMERCIAL

## 2024-04-18 NOTE — TELEPHONE ENCOUNTER
Pt called to [facility] appt with Dorota TAM for future medication refills       Cb number provided       Maximus Greenfield, Med Ass't

## 2024-07-09 ENCOUNTER — APPOINTMENT (OUTPATIENT)
Dept: MEDICAL GROUP | Facility: MEDICAL CENTER | Age: 39
End: 2024-07-09
Payer: COMMERCIAL

## 2024-07-16 ENCOUNTER — OFFICE VISIT (OUTPATIENT)
Dept: MEDICAL GROUP | Facility: MEDICAL CENTER | Age: 39
End: 2024-07-16
Payer: COMMERCIAL

## 2024-07-16 VITALS
TEMPERATURE: 97.7 F | HEART RATE: 76 BPM | HEIGHT: 70 IN | BODY MASS INDEX: 20.99 KG/M2 | OXYGEN SATURATION: 100 % | WEIGHT: 146.61 LBS | DIASTOLIC BLOOD PRESSURE: 64 MMHG | SYSTOLIC BLOOD PRESSURE: 128 MMHG

## 2024-07-16 DIAGNOSIS — Z23 NEED FOR VACCINATION: ICD-10-CM

## 2024-07-16 DIAGNOSIS — Z00.00 PE (PHYSICAL EXAM), ANNUAL: ICD-10-CM

## 2024-07-16 DIAGNOSIS — J45.30 MILD PERSISTENT ASTHMA WITHOUT COMPLICATION: ICD-10-CM

## 2024-07-16 DIAGNOSIS — R20.2 NUMBNESS AND TINGLING IN LEFT ARM: ICD-10-CM

## 2024-07-16 DIAGNOSIS — Z11.4 SCREENING FOR HIV (HUMAN IMMUNODEFICIENCY VIRUS): ICD-10-CM

## 2024-07-16 DIAGNOSIS — F19.90 ILLICIT DRUG USE: ICD-10-CM

## 2024-07-16 DIAGNOSIS — Z11.59 NEED FOR HEPATITIS C SCREENING TEST: ICD-10-CM

## 2024-07-16 DIAGNOSIS — C20 RECTAL CANCER (HCC): ICD-10-CM

## 2024-07-16 DIAGNOSIS — B35.1 ONYCHOMYCOSIS: ICD-10-CM

## 2024-07-16 DIAGNOSIS — R20.0 NUMBNESS AND TINGLING IN LEFT ARM: ICD-10-CM

## 2024-07-16 DIAGNOSIS — R07.89 FEELING OF CHEST TIGHTNESS: ICD-10-CM

## 2024-07-16 DIAGNOSIS — D50.0 IRON DEFICIENCY ANEMIA DUE TO CHRONIC BLOOD LOSS: ICD-10-CM

## 2024-07-16 DIAGNOSIS — R00.2 PALPITATION: ICD-10-CM

## 2024-07-16 PROBLEM — J38.3 VOCAL CORD DYSFUNCTION: Status: RESOLVED | Noted: 2022-07-26 | Resolved: 2024-07-16

## 2024-07-16 PROBLEM — K56.609 SMALL BOWEL OBSTRUCTION (HCC): Status: RESOLVED | Noted: 2020-04-28 | Resolved: 2024-07-16

## 2024-07-16 PROBLEM — R20.8: Status: RESOLVED | Noted: 2022-07-26 | Resolved: 2024-07-16

## 2024-07-16 PROBLEM — R19.4: Status: ACTIVE | Noted: 2022-10-28

## 2024-07-16 PROBLEM — K91.89: Status: RESOLVED | Noted: 2022-10-28 | Resolved: 2024-07-16

## 2024-07-16 PROBLEM — K62.5 RECTAL BLEEDING: Status: RESOLVED | Noted: 2019-07-30 | Resolved: 2024-07-16

## 2024-07-16 PROBLEM — R19.4: Status: RESOLVED | Noted: 2022-10-28 | Resolved: 2024-07-16

## 2024-07-16 PROBLEM — K91.89: Status: ACTIVE | Noted: 2022-10-28

## 2024-07-16 PROCEDURE — 90677 PCV20 VACCINE IM: CPT | Performed by: NURSE PRACTITIONER

## 2024-07-16 PROCEDURE — 99214 OFFICE O/P EST MOD 30 MIN: CPT | Mod: 25 | Performed by: NURSE PRACTITIONER

## 2024-07-16 PROCEDURE — 90471 IMMUNIZATION ADMIN: CPT | Performed by: NURSE PRACTITIONER

## 2024-07-16 PROCEDURE — 3078F DIAST BP <80 MM HG: CPT | Performed by: NURSE PRACTITIONER

## 2024-07-16 PROCEDURE — 99396 PREV VISIT EST AGE 40-64: CPT | Mod: 25 | Performed by: NURSE PRACTITIONER

## 2024-07-16 PROCEDURE — 3074F SYST BP LT 130 MM HG: CPT | Performed by: NURSE PRACTITIONER

## 2024-07-16 RX ORDER — KETOCONAZOLE 20 MG/G
1 CREAM TOPICAL 2 TIMES DAILY
Qty: 60 G | Refills: 1 | Status: SHIPPED | OUTPATIENT
Start: 2024-07-16

## 2024-07-16 RX ORDER — FLUTICASONE PROPIONATE AND SALMETEROL 100; 50 UG/1; UG/1
1 POWDER RESPIRATORY (INHALATION) EVERY 12 HOURS
Qty: 1 EACH | Refills: 3 | Status: SHIPPED | OUTPATIENT
Start: 2024-07-16

## 2024-07-16 RX ORDER — ALBUTEROL SULFATE 90 UG/1
2 AEROSOL, METERED RESPIRATORY (INHALATION) EVERY 6 HOURS PRN
Qty: 18 G | Refills: 1 | Status: SHIPPED | OUTPATIENT
Start: 2024-07-16

## 2024-07-16 ASSESSMENT — ENCOUNTER SYMPTOMS
WEAKNESS: 0
POLYDIPSIA: 0
SPEECH CHANGE: 0
EYE DISCHARGE: 0
WHEEZING: 0
DEPRESSION: 0
NAUSEA: 0
SHORTNESS OF BREATH: 0
FLANK PAIN: 0
CONSTIPATION: 0
VOMITING: 0
SENSORY CHANGE: 0
EYE REDNESS: 0
SPUTUM PRODUCTION: 0
COUGH: 0
MYALGIAS: 0
BLOOD IN STOOL: 0
ABDOMINAL PAIN: 0
FOCAL WEAKNESS: 0
DIZZINESS: 0
PALPITATIONS: 0
SINUS PAIN: 0
DIARRHEA: 0
LOSS OF CONSCIOUSNESS: 0
NERVOUS/ANXIOUS: 0
INSOMNIA: 0
BACK PAIN: 0
HEADACHES: 0
BRUISES/BLEEDS EASILY: 0
TREMORS: 0
WEIGHT LOSS: 0
CHILLS: 0
EYE PAIN: 0
FEVER: 0
SORE THROAT: 0

## 2024-07-16 ASSESSMENT — PATIENT HEALTH QUESTIONNAIRE - PHQ9: CLINICAL INTERPRETATION OF PHQ2 SCORE: 0

## 2024-08-06 ENCOUNTER — HOSPITAL ENCOUNTER (OUTPATIENT)
Dept: LAB | Facility: MEDICAL CENTER | Age: 39
End: 2024-08-06
Attending: NURSE PRACTITIONER
Payer: COMMERCIAL

## 2024-08-06 ENCOUNTER — ANCILLARY PROCEDURE (OUTPATIENT)
Dept: CARDIOLOGY | Facility: MEDICAL CENTER | Age: 39
End: 2024-08-06
Attending: NURSE PRACTITIONER
Payer: COMMERCIAL

## 2024-08-06 DIAGNOSIS — Z11.59 NEED FOR HEPATITIS C SCREENING TEST: ICD-10-CM

## 2024-08-06 DIAGNOSIS — Z11.4 SCREENING FOR HIV (HUMAN IMMUNODEFICIENCY VIRUS): ICD-10-CM

## 2024-08-06 DIAGNOSIS — R20.0 NUMBNESS AND TINGLING IN LEFT ARM: ICD-10-CM

## 2024-08-06 DIAGNOSIS — Z00.00 PE (PHYSICAL EXAM), ANNUAL: ICD-10-CM

## 2024-08-06 DIAGNOSIS — F19.90 ILLICIT DRUG USE: ICD-10-CM

## 2024-08-06 DIAGNOSIS — R20.2 NUMBNESS AND TINGLING IN LEFT ARM: ICD-10-CM

## 2024-08-06 DIAGNOSIS — R07.89 FEELING OF CHEST TIGHTNESS: ICD-10-CM

## 2024-08-06 DIAGNOSIS — R00.2 PALPITATION: ICD-10-CM

## 2024-08-06 LAB
25(OH)D3 SERPL-MCNC: 24 NG/ML (ref 30–100)
ALBUMIN SERPL BCP-MCNC: 4.3 G/DL (ref 3.2–4.9)
ALBUMIN/GLOB SERPL: 1.4 G/DL
ALP SERPL-CCNC: 53 U/L (ref 30–99)
ALT SERPL-CCNC: 17 U/L (ref 2–50)
ANION GAP SERPL CALC-SCNC: 14 MMOL/L (ref 7–16)
AST SERPL-CCNC: 25 U/L (ref 12–45)
BILIRUB SERPL-MCNC: 1.8 MG/DL (ref 0.1–1.5)
BUN SERPL-MCNC: 12 MG/DL (ref 8–22)
CALCIUM ALBUM COR SERPL-MCNC: 9 MG/DL (ref 8.5–10.5)
CALCIUM SERPL-MCNC: 9.2 MG/DL (ref 8.5–10.5)
CHLORIDE SERPL-SCNC: 102 MMOL/L (ref 96–112)
CHOLEST SERPL-MCNC: 137 MG/DL (ref 100–199)
CO2 SERPL-SCNC: 23 MMOL/L (ref 20–33)
CREAT SERPL-MCNC: 0.95 MG/DL (ref 0.5–1.4)
ERYTHROCYTE [DISTWIDTH] IN BLOOD BY AUTOMATED COUNT: 45.8 FL (ref 35.9–50)
GFR SERPLBLD CREATININE-BSD FMLA CKD-EPI: 103 ML/MIN/1.73 M 2
GLOBULIN SER CALC-MCNC: 3.1 G/DL (ref 1.9–3.5)
GLUCOSE SERPL-MCNC: 96 MG/DL (ref 65–99)
HCT VFR BLD AUTO: 48 % (ref 42–52)
HCV AB SER QL: NORMAL
HDLC SERPL-MCNC: 49 MG/DL
HGB BLD-MCNC: 16.1 G/DL (ref 14–18)
HIV 1+2 AB+HIV1 P24 AG SERPL QL IA: NORMAL
LDLC SERPL CALC-MCNC: 73 MG/DL
LV EJECT FRACT  99904: 65
LV EJECT FRACT MOD 2C 99903: 67.93
LV EJECT FRACT MOD 4C 99902: 61.59
LV EJECT FRACT MOD BP 99901: 64.93
MCH RBC QN AUTO: 31.5 PG (ref 27–33)
MCHC RBC AUTO-ENTMCNC: 33.5 G/DL (ref 32.3–36.5)
MCV RBC AUTO: 93.9 FL (ref 81.4–97.8)
PLATELET # BLD AUTO: 273 K/UL (ref 164–446)
PMV BLD AUTO: 9.6 FL (ref 9–12.9)
POTASSIUM SERPL-SCNC: 4.4 MMOL/L (ref 3.6–5.5)
PROT SERPL-MCNC: 7.4 G/DL (ref 6–8.2)
RBC # BLD AUTO: 5.11 M/UL (ref 4.7–6.1)
SODIUM SERPL-SCNC: 139 MMOL/L (ref 135–145)
TRIGL SERPL-MCNC: 74 MG/DL (ref 0–149)
TSH SERPL DL<=0.005 MIU/L-ACNC: 1.14 UIU/ML (ref 0.38–5.33)
WBC # BLD AUTO: 4.2 K/UL (ref 4.8–10.8)

## 2024-08-06 PROCEDURE — 93306 TTE W/DOPPLER COMPLETE: CPT | Mod: 26 | Performed by: INTERNAL MEDICINE

## 2024-08-06 PROCEDURE — 86803 HEPATITIS C AB TEST: CPT

## 2024-08-06 PROCEDURE — 82306 VITAMIN D 25 HYDROXY: CPT

## 2024-08-06 PROCEDURE — 85027 COMPLETE CBC AUTOMATED: CPT

## 2024-08-06 PROCEDURE — 36415 COLL VENOUS BLD VENIPUNCTURE: CPT

## 2024-08-06 PROCEDURE — 87389 HIV-1 AG W/HIV-1&-2 AB AG IA: CPT

## 2024-08-06 PROCEDURE — 80061 LIPID PANEL: CPT

## 2024-08-06 PROCEDURE — 80053 COMPREHEN METABOLIC PANEL: CPT

## 2024-08-06 PROCEDURE — 84443 ASSAY THYROID STIM HORMONE: CPT

## 2024-08-06 PROCEDURE — 93306 TTE W/DOPPLER COMPLETE: CPT

## 2025-02-04 ENCOUNTER — APPOINTMENT (OUTPATIENT)
Dept: MEDICAL GROUP | Facility: MEDICAL CENTER | Age: 40
End: 2025-02-04
Payer: COMMERCIAL

## 2025-02-06 ENCOUNTER — APPOINTMENT (OUTPATIENT)
Dept: MEDICAL GROUP | Facility: MEDICAL CENTER | Age: 40
End: 2025-02-06
Payer: COMMERCIAL

## 2025-02-07 ENCOUNTER — APPOINTMENT (OUTPATIENT)
Dept: MEDICAL GROUP | Facility: MEDICAL CENTER | Age: 40
End: 2025-02-07
Payer: COMMERCIAL

## 2025-02-11 DIAGNOSIS — J45.30 MILD PERSISTENT ASTHMA WITHOUT COMPLICATION: ICD-10-CM

## 2025-02-11 RX ORDER — CEPHALEXIN 250 MG/1
1 CAPSULE ORAL EVERY 12 HOURS
Qty: 60 EACH | Refills: 0 | Status: SHIPPED | OUTPATIENT
Start: 2025-02-11

## 2025-02-12 RX ORDER — ALBUTEROL SULFATE 90 UG/1
2 INHALANT RESPIRATORY (INHALATION) EVERY 6 HOURS PRN
Qty: 8.5 G | Refills: 0 | Status: SHIPPED | OUTPATIENT
Start: 2025-02-12

## 2025-03-11 ENCOUNTER — APPOINTMENT (OUTPATIENT)
Dept: MEDICAL GROUP | Facility: MEDICAL CENTER | Age: 40
End: 2025-03-11
Payer: COMMERCIAL

## 2025-03-11 VITALS
OXYGEN SATURATION: 98 % | DIASTOLIC BLOOD PRESSURE: 70 MMHG | BODY MASS INDEX: 20.2 KG/M2 | SYSTOLIC BLOOD PRESSURE: 110 MMHG | HEART RATE: 84 BPM | WEIGHT: 141.09 LBS | TEMPERATURE: 98.3 F | HEIGHT: 70 IN

## 2025-03-11 DIAGNOSIS — C20 RECTAL CANCER (HCC): ICD-10-CM

## 2025-03-11 DIAGNOSIS — E55.9 VITAMIN D DEFICIENCY: ICD-10-CM

## 2025-03-11 DIAGNOSIS — J45.30 MILD PERSISTENT ASTHMA WITHOUT COMPLICATION: ICD-10-CM

## 2025-03-11 DIAGNOSIS — R00.2 PALPITATION: ICD-10-CM

## 2025-03-11 DIAGNOSIS — F41.9 ANXIETY: ICD-10-CM

## 2025-03-11 DIAGNOSIS — Z12.11 SCREEN FOR COLON CANCER: ICD-10-CM

## 2025-03-11 PROCEDURE — 3078F DIAST BP <80 MM HG: CPT | Performed by: NURSE PRACTITIONER

## 2025-03-11 PROCEDURE — 3074F SYST BP LT 130 MM HG: CPT | Performed by: NURSE PRACTITIONER

## 2025-03-11 PROCEDURE — 99214 OFFICE O/P EST MOD 30 MIN: CPT | Performed by: NURSE PRACTITIONER

## 2025-03-11 RX ORDER — FLUTICASONE PROPIONATE AND SALMETEROL 100; 50 UG/1; UG/1
1 POWDER RESPIRATORY (INHALATION) EVERY 12 HOURS
Qty: 60 EACH | Refills: 3 | Status: SHIPPED | OUTPATIENT
Start: 2025-03-11

## 2025-03-11 RX ORDER — ALBUTEROL SULFATE 90 UG/1
2 INHALANT RESPIRATORY (INHALATION) EVERY 6 HOURS PRN
Qty: 8.5 G | Refills: 2 | Status: SHIPPED | OUTPATIENT
Start: 2025-03-11

## 2025-03-11 RX ORDER — ERGOCALCIFEROL 1.25 MG/1
50000 CAPSULE, LIQUID FILLED ORAL
Qty: 12 CAPSULE | Refills: 3 | Status: SHIPPED | OUTPATIENT
Start: 2025-03-11

## 2025-03-11 RX ORDER — PROPRANOLOL HYDROCHLORIDE 60 MG/1
60 CAPSULE, EXTENDED RELEASE ORAL DAILY
Qty: 30 CAPSULE | Refills: 1 | Status: SHIPPED | OUTPATIENT
Start: 2025-03-11

## 2025-03-11 ASSESSMENT — FIBROSIS 4 INDEX: FIB4 SCORE: 0.93

## 2025-03-11 ASSESSMENT — PATIENT HEALTH QUESTIONNAIRE - PHQ9: CLINICAL INTERPRETATION OF PHQ2 SCORE: 0

## 2025-03-11 NOTE — PROGRESS NOTES
Patient agreed to use of LIYAH: yes  Chief Complaint   Patient presents with    Cardiac Arrest       HISTORY OF PRESENT ILLNESS: Patient is a pleasant 40 y.o. male, established patient who presents today to discuss medical problems as listed below:    Assessment/Plan:    Cristhian was seen today for cardiac arrest.    Diagnoses and all orders for this visit:    Mild persistent asthma without complication  -     albuterol 108 (90 Base) MCG/ACT Aero Soln inhalation aerosol; Inhale 2 Puffs every 6 hours as needed for Shortness of Breath.  -     fluticasone-salmeterol (ADVAIR DISKUS) 100-50 MCG/ACT AEROSOL POWDER, BREATH ACTIVATED; Inhale 1 Puff every 12 hours.    Anxiety  -     propranolol LA (INDERAL LA) 60 MG CAPSULE SR 24 HR; Take 1 Capsule by mouth every day.    Palpitation  -     propranolol LA (INDERAL LA) 60 MG CAPSULE SR 24 HR; Take 1 Capsule by mouth every day.    Vitamin D deficiency  -     vitamin D2, Ergocalciferol, (DRISDOL) 1.25 MG (38108 UT) Cap capsule; Take 1 Capsule by mouth every 7 days.    Rectal cancer (HCC)    Screen for colon cancer  -     Referral to GI for Colonoscopy              Assessment & Plan  1. Asthma.  Chronic and stable condition.  Needing refills on his inhalers.  No concerns today.  His asthma is currently well-controlled with no recent exacerbations. Prescriptions for albuterol and Advair inhalers have been renewed. He is advised to always request refills from the pharmacy to ensure he does not run out of medication.    2. Anxiety.  Chronic and stable condition.  Anxiety recently related to feeling overloaded at work.  He is encouraged to discuss his work-related stress with his employer.  Trial of propranolol.  The potential benefits and side effects of propranolol were discussed, and he was informed that it may cause drowsiness. A prescription for propranolol extended-release 60 mg has been provided, with a total of 30 tablets. He is advised to take this medication as needed and to  discontinue use if it exacerbates his symptoms. If propranolol is not effective stopped taking, alternative treatments will be considered.    3.  Palpitations  Chronic intermittent condition.  Will obtain propranolol.  Will also obtain Zio patch.    4. Vitamin D deficiency.  Chronic and stable condition.  His vitamin D level is suboptimal at 24. A prescription for vitamin D 5000 IU once weekly has been provided, with instructions to take it with food. After completing the prescription, he is advised to continue with over-the-counter vitamin D 5000 IU.    5.  Rectal cancer  Chronic and stable condition.  Following up with oncologist.  Needing handicap placard for occasional episodes of diarrhea.  A referral for a colonoscopy has been made. A handicap placard valid for 2 years has been issued.    Follow-up  The patient will follow up in 4 weeks.    PROCEDURE  The patient underwent a J-pouch procedure for rectal cancer.    History of Present Illness  The patient presents for evaluation of asthma, anxiety, left arm pain, vitamin D deficiency, and for a handicap placard.    He reports that his asthma is well-managed, with no significant changes in his condition. He does not identify any specific triggers for his asthma, although he acknowledges that overexertion may contribute to his symptoms. He is currently using albuterol and Advair inhalers and is seeking refills.    He has been experiencing pain in his left arm and chest, which he initially attributed to cardiac issues. He also reported radiating pain in his right arm, extending from his wrist to his shoulder and neck, which has since resolved. Despite attempts at self-management through stretching, icing, and topical applications, the pain persisted for approximately one month before subsiding. He has been pain-free for the past 1.5 months. He also experienced intermittent chest pain for about a month, which has since resolved. He has been taking creatine  "supplements, which he believes have contributed to his pain relief. He underwent a stress test in [place] due to chest pain associated with breathing, which ruled out cardiac issues.    He has been under increased stress at work for the past six months, leading to anxiety and racing thoughts, particularly at night. He reports palpitations during these episodes. 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    He has a history of rectal cancer and underwent a J-pouch procedure. He is currently under the care of an oncologist, Dr. Hale, who conducts annual CT scans. He is scheduled for a colonoscopy. He has requested a handicap placard due to occasional mobility issues. He has lost weight, currently weighing 141 pounds, and has reduced his meat intake while maintaining protein consumption. He has also significantly reduced his alcohol intake, resulting in a 12-pound weight loss over two weeks.    SOCIAL HISTORY  The patient reports drinking a lot less alcohol and mentions losing 12 pounds after not drinking for two weeks.    MEDICATIONS  Current: albuterol, Advair    Health Maintenance:  COMPLETED     Allergies, past medical history, past surgical history, family history, social history reviewed and updated.    ROS     Exam:    /70   Pulse 84   Temp 36.8 °C (98.3 °F) (Temporal)   Ht 1.778 m (5' 10\")   Wt 64 kg (141 lb 1.5 oz)   SpO2 98%   BMI 20.24 kg/m²  Body mass index is 20.24 kg/m².    Physical Exam     Results  Laboratory Studies  Vitamin D level was low at 24.    Imaging  Transthoracic echocardiogram was normal.       Discussed with patient possible alternative diagnoses, patient is to take all medications as prescribed.      If symptoms persist FU w/PCP, if symptoms worsen go to emergency room.      If experiencing any side effects from prescribed medications report to the office immediately or go to emergency room.     Reviewed indication, dosage, " usage and potential adverse effects of prescribed medications.      Reviewed risks and benefits of treatment plan. Patient verbalizes understanding of all instruction and verbally agrees to plan.     Discussed plan with the patient, and patient agrees to the above.      I personally reviewed prior external notes and test results pertinent to today's visit.      No follow-ups on file. 4-6 wks

## 2025-03-18 NOTE — Clinical Note
REFERRAL APPROVAL NOTICE         Sent on March 18, 2025                   Cristhian Silva  [address]  [place] [address]                   Dear Mr. Silva,    After a careful review of the medical information and benefit coverage, [facility] has processed your referral. See below for additional details.    If applicable, you must be actively enrolled with your insurance for coverage of the authorized service. If you have any questions regarding your coverage, please contact your insurance directly.    REFERRAL INFORMATION   Referral #:  [id number]  Referred-To Provider    Referred-By Provider:  Gastroenterology    CAROL Fermin    [facility]      [address]  [address]  [facility] [address]  [phone] [address] [address] [address]  [phone]    Referral Start Date:  03/11/2025  Referral End Date:   03/11/2026             SCHEDULING  If you do not already have an appointment, please call [phone] to make an appointment.     MORE INFORMATION  If you do not already have a [employer] account, sign up at: [email or web address]  You can access your medical information, make appointments, see lab results, billing information, and more.  If you have questions regarding this referral, please contact  the [facility] Referrals department at:             [phone]. Monday - Friday 8:00AM - 5:00PM.     Sincerely,    [facility]

## 2025-03-20 ENCOUNTER — TELEPHONE (OUTPATIENT)
Dept: HEALTH INFORMATION MANAGEMENT | Facility: OTHER | Age: 40
End: 2025-03-20
Payer: COMMERCIAL

## 2025-04-15 ENCOUNTER — TELEMEDICINE (OUTPATIENT)
Dept: MEDICAL GROUP | Facility: MEDICAL CENTER | Age: 40
End: 2025-04-15
Payer: COMMERCIAL

## 2025-04-15 DIAGNOSIS — R00.2 PALPITATION: ICD-10-CM

## 2025-04-15 DIAGNOSIS — G47.00 INSOMNIA, UNSPECIFIED TYPE: ICD-10-CM

## 2025-04-15 DIAGNOSIS — J45.30 MILD PERSISTENT ASTHMA WITHOUT COMPLICATION: ICD-10-CM

## 2025-04-15 DIAGNOSIS — F41.9 ANXIETY: ICD-10-CM

## 2025-04-15 PROCEDURE — 99214 OFFICE O/P EST MOD 30 MIN: CPT | Performed by: NURSE PRACTITIONER

## 2025-04-15 RX ORDER — SERTRALINE HYDROCHLORIDE 25 MG/1
25 TABLET, FILM COATED ORAL DAILY
Qty: 60 TABLET | Refills: 1 | Status: SHIPPED | OUTPATIENT
Start: 2025-04-15

## 2025-04-15 ASSESSMENT — ENCOUNTER SYMPTOMS
CHILLS: 0
PALPITATIONS: 0
FEVER: 0

## 2025-04-16 NOTE — PROGRESS NOTES
Virtual Visit: Established Patient   This visit was conducted via Teams using secure and encrypted videoconferencing technology.   The patient was in their home in the [facility].    The patient's identity was confirmed and verbal consent was obtained for this virtual visit.   This evaluation was conducted via Teams using secure and encrypted videoconferencing technology. The patient was in their home in the [facility].    The patient's identity was confirmed and verbal consent was obtained for this virtual visit.     Patient agreed to using LIYAH: yes    Diagnoses and all orders for this visit:    Anxiety  -     sertraline (ZOLOFT) 25 MG tablet; Take 1 Tablet by mouth every day.    Palpitation  -     sertraline (ZOLOFT) 25 MG tablet; Take 1 Tablet by mouth every day.    Insomnia, unspecified type  -     sertraline (ZOLOFT) 25 MG tablet; Take 1 Tablet by mouth every day.    Mild persistent asthma without complication              Assessment & Plan  1. Anxiety  2.   - Slight improvement in anxiety symptoms with propranolol, but still experiences anxiety throughout the day, especially during busy weeks  - Occasional racing thoughts at night, with some improvement in sleep quality  - Initiate Zoloft 25 mg daily to help manage anxiety and improve sleep; prescription for 60 tablets provided  - Discontinue Zoloft immediately if symptoms worsen; dosage may be increased if no improvement; propranolol may be discontinued or used as adjunct therapy if Zoloft is effective    3. Asthma  Chronic and stable condition.  This is well-controlled with inhalers in place, continue.  - Asthma is stable with current treatment  - Uses Advair regularly and albuterol as a rescue inhaler during occasional breathing episodes  - Advised to continue current regimen and use deep breathing exercises as needed  - No changes in medication or treatment plan at this time    Follow-up  - Follow-up appointment scheduled in 3 weeks to assess the  effectiveness of Zoloft and overall management of anxiety and asthma    Subjective:   CC: No chief complaint on file.    History of Present Illness  The patient presents via virtual visit for follow-up on propranolol and asthma.    A slight improvement in anxiety symptoms is reported since starting propranolol approximately a month ago. Mild anxiety persists throughout the day, influenced by daily activities. This week has been particularly demanding at work, leading to an increase in anxiety levels compared to the initial period of medication use. Nighttime awakenings have decreased, and returning to sleep after waking up to use the restroom has become easier. Despite these improvements, occasional racing thoughts are still experienced.    Asthma remains stable with no significant changes since the last visit. Inhalers are used regularly, and sufficient refills are available. Albuterol is used sparingly, only during episodes of breathlessness, which occur infrequently. Deep breathing exercises, specifically inhaling through the nose, holding the breath, and exhaling through the mouth, are beneficial in managing symptoms.       Review of Systems   Constitutional:  Negative for chills, fever and malaise/fatigue.   Cardiovascular:  Negative for chest pain, palpitations and leg swelling.        Current medicines (including changes today)  Current Outpatient Medications   Medication Sig Dispense Refill    sertraline (ZOLOFT) 25 MG tablet Take 1 Tablet by mouth every day. 60 Tablet 1    albuterol 108 (90 Base) MCG/ACT Aero Soln inhalation aerosol Inhale 2 Puffs every 6 hours as needed for Shortness of Breath. 8.5 g 2    fluticasone-salmeterol (ADVAIR DISKUS) 100-50 MCG/ACT AEROSOL POWDER, BREATH ACTIVATED Inhale 1 Puff every 12 hours. 60 Each 3    propranolol LA (INDERAL LA) 60 MG CAPSULE SR 24 HR Take 1 Capsule by mouth every day. 30 Capsule 1    vitamin D2, Ergocalciferol, (DRISDOL) 1.25 MG (47105 UT) Cap capsule Take  1 Capsule by mouth every 7 days. 12 Capsule 3    ipratropium-albuterol (COMBIVENT RESPIMAT)  MCG/ACT Aero Soln Inhale 1 Puff 4 times a day.      ketoconazole (NIZORAL) 2 % Cream Apply 1 Application topically 2 times a day. 60 g 1    Multiple Vitamin (MULTI-VITAMIN) Tab Take 1 Tablet by mouth every day.       No current facility-administered medications for this visit.        Objective:   There were no vitals taken for this visit.    Physical Exam:  Constitutional: Alert, no distress, well-groomed.  Skin: No rashes in visible areas.  Eye: Round. Conjunctiva clear, lids normal. No icterus.   ENMT: Lips pink without lesions, good dentition, moist mucous membranes. Phonation normal.  Neck: No masses, no thyromegaly. Moves freely without pain.  Respiratory: Unlabored respiratory effort, no cough or audible wheeze  Psych: Alert and oriented x3, normal affect and mood.     Results         Discussed with patient possible alternative diagnoses, patient is to take all medications as prescribed.      If symptoms persist FU w/PCP, if symptoms worsen go to emergency room.      If experiencing any side effects from prescribed medications report to the office immediately or go to emergency room.     Reviewed indication, dosage, usage and potential adverse effects of prescribed medications.      Reviewed risks and benefits of treatment plan. Patient verbalizes understanding of all instruction and verbally agrees to plan.     Discussed plan with the patient, and patient agrees to the above.      I personally reviewed prior external notes and test results pertinent to today's visit.     No follow-ups on file. 3 wks

## 2025-05-12 DIAGNOSIS — F41.9 ANXIETY: ICD-10-CM

## 2025-05-12 DIAGNOSIS — R00.2 PALPITATION: ICD-10-CM

## 2025-05-13 RX ORDER — PROPRANOLOL HYDROCHLORIDE 60 MG/1
60 CAPSULE, EXTENDED RELEASE ORAL DAILY
Qty: 30 CAPSULE | Refills: 0 | Status: SHIPPED | OUTPATIENT
Start: 2025-05-13